# Patient Record
Sex: FEMALE | Race: WHITE | Employment: FULL TIME | URBAN - METROPOLITAN AREA
[De-identification: names, ages, dates, MRNs, and addresses within clinical notes are randomized per-mention and may not be internally consistent; named-entity substitution may affect disease eponyms.]

---

## 2017-10-20 ENCOUNTER — TELEPHONE (OUTPATIENT)
Dept: INTERNAL MEDICINE CLINIC | Facility: CLINIC | Age: 27
End: 2017-10-20

## 2017-10-20 DIAGNOSIS — R53.83 OTHER FATIGUE: ICD-10-CM

## 2017-10-20 DIAGNOSIS — Z00.00 ANNUAL PHYSICAL EXAM: Primary | ICD-10-CM

## 2017-10-20 NOTE — TELEPHONE ENCOUNTER
Patient scheduled physical with Dr Na Morley on Jan 2,2018  Does Dr Kerry Romero want pt to have labs - if so requests order to have labs done same day prior to her appt    Please call pt to advise [phone]

## 2017-10-20 NOTE — TELEPHONE ENCOUNTER
Patient has physical on 1/2/2018  Would you like her to do labs? Lab orders pended per protocol.     To Dr Amber Hamm